# Patient Record
Sex: FEMALE | Race: BLACK OR AFRICAN AMERICAN | Employment: OTHER | URBAN - METROPOLITAN AREA
[De-identification: names, ages, dates, MRNs, and addresses within clinical notes are randomized per-mention and may not be internally consistent; named-entity substitution may affect disease eponyms.]

---

## 2017-02-28 PROBLEM — F41.9 ANXIETY: Status: ACTIVE | Noted: 2017-02-28

## 2017-02-28 PROBLEM — F32.1 MODERATE SINGLE CURRENT EPISODE OF MAJOR DEPRESSIVE DISORDER (HCC): Status: ACTIVE | Noted: 2017-02-28

## 2017-10-18 PROBLEM — Z01.419 WOMEN'S ANNUAL ROUTINE GYNECOLOGICAL EXAMINATION: Status: ACTIVE | Noted: 2017-10-18

## 2022-03-18 PROBLEM — F41.9 ANXIETY: Status: ACTIVE | Noted: 2017-02-28

## 2022-03-19 PROBLEM — Z01.419 WOMEN'S ANNUAL ROUTINE GYNECOLOGICAL EXAMINATION: Status: ACTIVE | Noted: 2017-10-18

## 2022-03-19 PROBLEM — F32.1 MODERATE SINGLE CURRENT EPISODE OF MAJOR DEPRESSIVE DISORDER (HCC): Status: ACTIVE | Noted: 2017-02-28

## 2022-04-14 ENCOUNTER — APPOINTMENT (OUTPATIENT)
Dept: GENERAL RADIOLOGY | Age: 52
End: 2022-04-14
Attending: PHYSICIAN ASSISTANT

## 2022-04-14 ENCOUNTER — HOSPITAL ENCOUNTER (EMERGENCY)
Age: 52
Discharge: HOME | End: 2022-04-14
Attending: STUDENT IN AN ORGANIZED HEALTH CARE EDUCATION/TRAINING PROGRAM

## 2022-04-14 ENCOUNTER — APPOINTMENT (OUTPATIENT)
Dept: CT IMAGING | Age: 52
End: 2022-04-14
Attending: PHYSICIAN ASSISTANT

## 2022-04-14 VITALS
RESPIRATION RATE: 16 BRPM | BODY MASS INDEX: 39.27 KG/M2 | OXYGEN SATURATION: 98 % | SYSTOLIC BLOOD PRESSURE: 148 MMHG | HEART RATE: 74 BPM | WEIGHT: 230 LBS | HEIGHT: 64 IN | DIASTOLIC BLOOD PRESSURE: 84 MMHG | TEMPERATURE: 98.2 F

## 2022-04-14 DIAGNOSIS — G44.209 TENSION HEADACHE: Primary | ICD-10-CM

## 2022-04-14 DIAGNOSIS — M62.838 CERVICAL PARASPINAL MUSCLE SPASM: ICD-10-CM

## 2022-04-14 PROCEDURE — 96372 THER/PROPH/DIAG INJ SC/IM: CPT

## 2022-04-14 PROCEDURE — 99284 EMERGENCY DEPT VISIT MOD MDM: CPT

## 2022-04-14 PROCEDURE — 70450 CT HEAD/BRAIN W/O DYE: CPT

## 2022-04-14 PROCEDURE — 72040 X-RAY EXAM NECK SPINE 2-3 VW: CPT

## 2022-04-14 PROCEDURE — 74011250636 HC RX REV CODE- 250/636: Performed by: PHYSICIAN ASSISTANT

## 2022-04-14 RX ORDER — DICLOFENAC SODIUM 75 MG/1
75 TABLET, DELAYED RELEASE ORAL 2 TIMES DAILY
Qty: 14 TABLET | Refills: 0 | Status: SHIPPED | OUTPATIENT
Start: 2022-04-14 | End: 2022-04-21

## 2022-04-14 RX ORDER — CYCLOBENZAPRINE HCL 10 MG
10 TABLET ORAL
Qty: 15 TABLET | Refills: 0 | Status: SHIPPED | OUTPATIENT
Start: 2022-04-14

## 2022-04-14 RX ORDER — KETOROLAC TROMETHAMINE 30 MG/ML
30 INJECTION, SOLUTION INTRAMUSCULAR; INTRAVENOUS ONCE
Status: COMPLETED | OUTPATIENT
Start: 2022-04-14 | End: 2022-04-14

## 2022-04-14 RX ADMIN — KETOROLAC TROMETHAMINE 30 MG: 30 INJECTION, SOLUTION INTRAMUSCULAR at 16:22

## 2022-04-14 NOTE — ED TRIAGE NOTES
Patient advises that she woke up with a \"crick\" in her neck on 4/10 and used a towel to attempt to stretch her neck muscles out. Patient advises that helped however since she has bene having pain to back posterior right side of head, no known trauma. Patient was concerned of HTN and currently is not on any medication.  Masked

## 2022-04-14 NOTE — ED PROVIDER NOTES
Patient presents to the emergency department stating that she woke up 3 days ago with some stiffness and pain on the right side of her neck like she slept awkwardly. She is not aware of any known injury. She states that over the past few days she has been having a right posterior headache and does not frequently get headaches. She denies any vertigo symptoms. No fever, URI symptoms, confusion or ataxia. No OTC pain levers have been taken today for her symptoms. She denies any pain that radiates down the right arm or any numbness or tingling.            Past Medical History:   Diagnosis Date    Moderate single current episode of major depressive disorder ([facility] [address] 2/28/2017       Past Surgical History:   Procedure Laterality Date    ABDOMEN SURGERY PROC UNLISTED      hernia    HX GYN      c-sect x 2    HX GYN      tubal ligation    HX OTHER SURGICAL      endometrial ablation     MO LAP,TUBAL CAUTERY           Family History:   Problem Relation Age of Onset    Hypertension Mother     Depression Mother     Cancer Father         pancreatic    Breast Cancer Neg Hx        Social History     Socioeconomic History    Marital status:      Spouse name: Not on file    Number of children: Not on file    Years of education: Not on file    Highest education level: Not on file   Occupational History    Not on file   Tobacco Use    Smoking status: Never Smoker    Smokeless tobacco: Never Used   Substance and Sexual Activity    Alcohol use: No    Drug use: No    Sexual activity: Yes     Partners: Male     Birth control/protection: Surgical   Other Topics Concern     Service Not Asked    Blood Transfusions Not Asked    Caffeine Concern No    Occupational Exposure Not Asked    Hobby Hazards Not Asked    Sleep Concern Not Asked    Stress Concern Not Asked    Weight Concern Not Asked    Special Diet Not Asked    Back Care Not Asked    Exercise Yes    Bike Helmet Not Asked   [address] [facility] Yes  Self-Exams Yes   Social History Narrative    Abuse: Feels safe at home, no history of physical abuse, no history of sexual abuse     Social Determinants of Health     Financial Resource Strain:     Difficulty of Paying Living Expenses: Not on file   Food Insecurity:     Worried About Running Out of Food in the Last Year: Not on file    Jaren of Food in the Last Year: Not on file   Transportation Needs:     Lack of Transportation (Medical): Not on file    Lack of Transportation (Non-Medical): Not on file   Physical Activity:     Days of Exercise per Week: Not on file    Minutes of Exercise per Session: Not on file   Stress:     Feeling of Stress : Not on file   Social Connections:     Frequency of Communication with Friends and Family: Not on file    Frequency of Social Gatherings with Friends and Family: Not on file    Attends [employer] Services: Not on file    Active Member of [address] [place] [address] [employer] or Organizations: Not on file    Attends Club or Organization Meetings: Not on file    Marital Status: Not on file   Intimate Partner Violence:     Fear of Current or Ex-Partner: Not on file    Emotionally Abused: Not on file    Physically Abused: Not on file    Sexually Abused: Not on file   Housing Stability:     Unable to Pay for Housing in the Last Year: Not on file    Number of [place] in the Last Year: Not on file    Unstable Housing in the Last Year: Not on file         ALLERGIES: Patient has no known allergies. Review of Systems   Musculoskeletal: Positive for neck pain. Neurological: Positive for headaches. Negative for dizziness. All other systems reviewed and are negative. Vitals:    04/14/22 1413 04/14/22 1416   BP: (!) 175/87 (!) 156/92   Pulse: 92    Resp: 16    Temp: 98.2 °F (36.8 °C)    SpO2: 100%    Weight: 104.3 kg (230 lb)    Height: 5' 4\" (1.626 m)             Physical Exam  Vitals and nursing note reviewed. Constitutional:       Appearance: Normal appearance.    HENT: Head: Normocephalic and atraumatic. Right Ear: Tympanic membrane normal.      Left Ear: Tympanic membrane normal.      Nose: Nose normal.      Mouth/Throat:      Mouth: Mucous membranes are moist.   Eyes:      Extraocular Movements: Extraocular movements intact. Conjunctiva/sclera: Conjunctivae normal.      Pupils: Pupils are equal, round, and reactive to light. Cardiovascular:      Rate and Rhythm: Normal rate and regular rhythm. Pulses: Normal pulses. Heart sounds: Normal heart sounds. Pulmonary:      Effort: Pulmonary effort is normal.      Breath sounds: Normal breath sounds. Abdominal:      General: Abdomen is flat. There is no distension. Palpations: Abdomen is soft. Tenderness: There is no abdominal tenderness. There is no guarding. Musculoskeletal:      Cervical back: Normal range of motion and neck supple. Comments: Has mild midline cervical spinous process tenderness to palpation and tenderness of the right trapezius muscle. Patient is able to flex her chin down to her chest wall. Skin:     General: Skin is warm and dry. Neurological:      General: No focal deficit present. Mental Status: She is alert. Cranial Nerves: No cranial nerve deficit. Motor: No weakness. Gait: Gait normal.   Psychiatric:         Mood and Affect: Mood normal.         Behavior: Behavior normal.         Thought Content: Thought content normal.          MDM  Number of Diagnoses or Management Options  Cervical paraspinal muscle spasm  Tension headache  Diagnosis management comments: Differential diagnoses: Torticollis, cervical muscle spasm, tension headache, intracranial hemorrhage    Patient's head CT is unremarkable and cervical spine shows a loss of normal lordosis. She was given IM Toradol for pain which has helped. I will give her prescription for a muscle relaxant and anti-inflammatory.        Amount and/or Complexity of Data Reviewed  Tests in the radiology section of CPT®: reviewed    Risk of Complications, Morbidity, and/or Mortality  Presenting problems: moderate  Diagnostic procedures: moderate  Management options: low    Patient Progress  Patient progress: improved         Procedures

## 2022-04-14 NOTE — ED NOTES
I have reviewed discharge instructions with the patient. The patient verbalized understanding. Patient left ED via Discharge Method: ambulatory to Home with self . Opportunity for questions and clarification provided. Patient given 2 scripts. To continue your aftercare when you leave the hospital, you may receive an automated call from our care team to check in on how you are doing. This is a free service and part of our promise to provide the best care and service to meet your aftercare needs.  If you have questions, or wish to unsubscribe from this service please call [phone]. Thank you for Choosing our [facility] Emergency Department.

## 2022-04-14 NOTE — Clinical Note
[address] [address] EMERGENCY DEPT  [address] [address] [phone]    Work/School Note    Date: 4/14/2022    To Whom It May concern:    Ney Dumont was seen and treated today in the emergency room by the following provider(s):  Attending Provider: Kathy Prakash DO  Physician Assistant: Yanira Amaya, [address]. Ney Dumont is excused from work/school on 4/14/2022 through 4/16/2022. She is medically clear to return to work/school on 4/17/2022.          Sincerely,          LUCILA Ansari

## 2022-04-14 NOTE — DISCHARGE INSTRUCTIONS
Follow-up with a family physician next week if pain is not improving. If you need 1 you can contact the [address] clinic or [facility].

## 2024-03-12 ENCOUNTER — TRANSCRIBE ORDERS (OUTPATIENT)
Dept: SCHEDULING | Age: 54
End: 2024-03-12

## 2024-03-12 DIAGNOSIS — Z12.31 ENCOUNTER FOR SCREENING MAMMOGRAM FOR MALIGNANT NEOPLASM OF BREAST: Primary | ICD-10-CM

## 2024-03-16 ENCOUNTER — HOSPITAL ENCOUNTER (OUTPATIENT)
Dept: MAMMOGRAPHY | Age: 54
End: 2024-03-16
Payer: COMMERCIAL

## 2024-03-16 VITALS — BODY MASS INDEX: 37.56 KG/M2 | HEIGHT: 64 IN | WEIGHT: 220 LBS

## 2024-03-16 DIAGNOSIS — Z12.31 ENCOUNTER FOR SCREENING MAMMOGRAM FOR MALIGNANT NEOPLASM OF BREAST: ICD-10-CM

## 2024-03-16 PROCEDURE — 77063 BREAST TOMOSYNTHESIS BI: CPT

## 2024-06-03 VITALS
WEIGHT: 230 LBS | DIASTOLIC BLOOD PRESSURE: 89 MMHG | TEMPERATURE: 98.5 F | RESPIRATION RATE: 16 BRPM | SYSTOLIC BLOOD PRESSURE: 145 MMHG | OXYGEN SATURATION: 98 % | HEART RATE: 91 BPM | BODY MASS INDEX: 39.27 KG/M2 | HEIGHT: 64 IN

## 2024-06-03 PROCEDURE — 99283 EMERGENCY DEPT VISIT LOW MDM: CPT

## 2024-06-03 ASSESSMENT — LIFESTYLE VARIABLES
HOW MANY STANDARD DRINKS CONTAINING ALCOHOL DO YOU HAVE ON A TYPICAL DAY: PATIENT DOES NOT DRINK
HOW OFTEN DO YOU HAVE A DRINK CONTAINING ALCOHOL: NEVER

## 2024-06-03 ASSESSMENT — PAIN DESCRIPTION - PAIN TYPE: TYPE: ACUTE PAIN

## 2024-06-03 ASSESSMENT — PAIN - FUNCTIONAL ASSESSMENT: PAIN_FUNCTIONAL_ASSESSMENT: 0-10

## 2024-06-03 ASSESSMENT — PAIN SCALES - GENERAL: PAINLEVEL_OUTOF10: 5

## 2024-06-03 ASSESSMENT — PAIN DESCRIPTION - FREQUENCY: FREQUENCY: INTERMITTENT

## 2024-06-03 ASSESSMENT — PAIN DESCRIPTION - ONSET: ONSET: SUDDEN

## 2024-06-03 ASSESSMENT — PAIN DESCRIPTION - LOCATION: LOCATION: KNEE

## 2024-06-03 ASSESSMENT — PAIN DESCRIPTION - ORIENTATION: ORIENTATION: LEFT

## 2024-06-04 ENCOUNTER — HOSPITAL ENCOUNTER (EMERGENCY)
Age: 54
Discharge: HOME | End: 2024-06-04
Payer: COMMERCIAL

## 2024-06-04 ENCOUNTER — APPOINTMENT (OUTPATIENT)
Dept: GENERAL RADIOLOGY | Age: 54
End: 2024-06-04
Payer: COMMERCIAL

## 2024-06-04 DIAGNOSIS — M25.462 KNEE EFFUSION, LEFT: Primary | ICD-10-CM

## 2024-06-04 PROCEDURE — 6370000000 HC RX 637 (ALT 250 FOR IP): Performed by: NURSE PRACTITIONER

## 2024-06-04 PROCEDURE — 73562 X-RAY EXAM OF KNEE 3: CPT

## 2024-06-04 RX ORDER — IBUPROFEN 600 MG/1
600 TABLET ORAL EVERY 8 HOURS PRN
Qty: 12 TABLET | Refills: 0 | Status: SHIPPED | OUTPATIENT
Start: 2024-06-04 | End: 2024-06-08

## 2024-06-04 RX ORDER — IBUPROFEN 600 MG/1
600 TABLET ORAL
Status: COMPLETED | OUTPATIENT
Start: 2024-06-04 | End: 2024-06-04

## 2024-06-04 RX ADMIN — IBUPROFEN 600 MG: 600 TABLET, FILM COATED ORAL at 00:42

## 2024-06-04 NOTE — ED PROVIDER NOTES
Emergency Department Provider Note       PCP: No, Pcp   Age: 54 y.o.   Sex: female     DISPOSITION Discharge - Pending Orders Complete 06/04/2024 01:17:28 AM       ICD-10-CM    1. Knee effusion, left  M25.462 [facility]          Medical Decision Making     As in HPI.  Left knee pain with no other complaint.  Is neurovascular intact.  No fall or injury.  No pain distal to or proximal to the left knee.  Made worse with range of motion.  Has history of OA.  Has regular recurring left knee pain.  No recent illness.  I have low clinical suspicion at this time for septic joint, dislocation, arterial injury, DVT, or other acute emergent process.  Radiographs obtained and notable for small suprapatellar effusion.  Will place in Ace wrap, discussed therapeutic options, will give NSAIDs and refer to orthopedics for close follow-up.  She declined crutches.  Patient is well-hydrated appearing, no distress.  Nontoxic-appearing, tolerating oral intake, hemodynamically stable. All findings and plan were discussed with the patient. All questions answered. Discussed with the patient that an unremarkable evaluation in the ED does not preclude the development or presence of a serious or life threatening condition. Patient was instructed to return immediately for any worsening or change in current symptoms, or if symptoms do not continue to improve. I instructed them to follow up with their primary care provider, own specialist, or medical provider that I am recommending for him within the next 2-3 days  The patient acknowledged understanding plan of care and affirmed approval.     Signed by: MAEVE Lyon     This note created using Dragon voice recognition software.  Please excuse any accidental errors associated with its use, as note has not been fully proofread and edited.         1 or more acute illnesses that pose a threat to life or bodily function.   1 or more chronic illnesses with a

## 2024-06-04 NOTE — ED TRIAGE NOTES
Pt arrives A&Ox4 ambulatory. Pt reports at work tonight started having pain then subsequent swelling in L knee. Denies redness/warmth. Pt reports hx arthritis.

## 2024-06-04 NOTE — DISCHARGE INSTRUCTIONS
Follow-up with recommended provider in the next 1-2 days.  Return to the ED immediately for any new, worsening, concerning symptoms; or for danger signs as discussed.

## 2024-07-25 ENCOUNTER — OFFICE VISIT (OUTPATIENT)
Dept: ORTHOPEDIC SURGERY | Age: 54
End: 2024-07-25
Payer: COMMERCIAL

## 2024-07-25 VITALS — HEIGHT: 64 IN | WEIGHT: 230 LBS | BODY MASS INDEX: 39.27 KG/M2

## 2024-07-25 DIAGNOSIS — M25.561 PAIN IN BOTH KNEES, UNSPECIFIED CHRONICITY: Primary | ICD-10-CM

## 2024-07-25 DIAGNOSIS — M76.899 HAMSTRING TENDONITIS: ICD-10-CM

## 2024-07-25 DIAGNOSIS — M17.11 PRIMARY OSTEOARTHRITIS OF RIGHT KNEE: ICD-10-CM

## 2024-07-25 DIAGNOSIS — M25.562 PAIN IN BOTH KNEES, UNSPECIFIED CHRONICITY: Primary | ICD-10-CM

## 2024-07-25 DIAGNOSIS — M17.12 PRIMARY OSTEOARTHRITIS OF LEFT KNEE: ICD-10-CM

## 2024-07-25 PROCEDURE — 99204 OFFICE O/P NEW MOD 45 MIN: CPT | Performed by: PHYSICIAN ASSISTANT

## 2024-07-25 RX ORDER — DICLOFENAC SODIUM 75 MG/1
75 TABLET, DELAYED RELEASE ORAL 2 TIMES DAILY
Qty: 60 TABLET | Refills: 1 | Status: SHIPPED | OUTPATIENT
Start: 2024-07-25

## 2024-07-25 NOTE — PROGRESS NOTES
Name: Janay Oreilly  YOB: 1970  Gender: female  MRN: [id number]    CC: Knee Pain (L)  And right knee pain    HPI: Janay Oreilly is a 54 y.o. female who presents with left and right knee pain    She is here today for bilateral knee pain.  She was seen in the ER in early June for the left knee because of increased pain after work.  She has had knee pain for a while now. She received cortisone injections from a doctor in [place] but she did not have any relief from the shots and states she had a bad reaction to the shots.  She is not interested in having those again. She went to the ER on 6/4/24 and they told her she had some fluid on her knee and they told her to rest and take ibuprofen as needed.  She states she does have pain going up and down stairs.  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  She states all of her pain is along the medial aspect of this knee.  She is here today for further evaluation of bilateral knee pain.    ROS/Meds/PSH/PMH/FH/SH: I personally reviewed the patients standard intake form.  Below are the pertinents    Tobacco:  reports that she has never smoked. She has never used smokeless tobacco.  Diabetes: none  Other: None    Physical Examination:  General: no acute distress  Lungs: breathing easily  CV: regular rhythm by pulse  Right Knee: scar present on anterior knee. No joint effusion present. Patella tracks centrally with 2+ patellofemoral grind. Neutral mechanical alignment present. Passive ROM: 0 degrees of hyperextension with 110 degrees of flexion. Stable to varus and valgus stress at full extension and 30 degrees of flexion.  Ligamentously stable.  No pain with McMurrays over medial or lateral joint line. Tender to palpate over Medial joint line. negative Apprehension test.  Tender palpate along hamstring tendons.  She does have tenderness on the pes bursa.  Calf is soft and nontender to palpation. Motor and sensory intact distally.

## 2024-09-05 ENCOUNTER — OFFICE VISIT (OUTPATIENT)
Dept: ORTHOPEDIC SURGERY | Age: 54
End: 2024-09-05
Payer: COMMERCIAL

## 2024-09-05 DIAGNOSIS — M17.11 PRIMARY OSTEOARTHRITIS OF RIGHT KNEE: ICD-10-CM

## 2024-09-05 DIAGNOSIS — M25.562 PAIN IN BOTH KNEES, UNSPECIFIED CHRONICITY: Primary | ICD-10-CM

## 2024-09-05 DIAGNOSIS — M17.12 PRIMARY OSTEOARTHRITIS OF LEFT KNEE: ICD-10-CM

## 2024-09-05 DIAGNOSIS — M25.561 PAIN IN BOTH KNEES, UNSPECIFIED CHRONICITY: Primary | ICD-10-CM

## 2024-09-05 PROCEDURE — 20610 DRAIN/INJ JOINT/BURSA W/O US: CPT | Performed by: PHYSICIAN ASSISTANT

## 2024-09-05 NOTE — PROGRESS NOTES
Name: Janay Oreilly  YOB: 1970  Gender: female  MRN: [id number]    CC: Knee Pain (B)     HPI: Janay Oreilly is a 54 y.o. female who presents first for injection of Synvisc.     Physical Examination:  General: no acute distress  bilateral Knee: Exam is unchanged, the knee continues to be painful with palpation and range of motion. There is no effusion or concern for infection.    Assessment:   1. Pain in both knees, unspecified chronicity    2. Primary osteoarthritis of right knee    3. Primary osteoarthritis of left knee         Plan:     Bilateral knee injected from a anterior lateral approach with 2 mL Synvisc viscosupplementation after sterile prep.  Patient tolerated the procedure well was given postinjection flare precautions.      Follow up for second injection next week.       Fatou Arshad PA-C  Orthopaedics and Sports Medicine

## 2024-09-12 ENCOUNTER — OFFICE VISIT (OUTPATIENT)
Dept: ORTHOPEDIC SURGERY | Age: 54
End: 2024-09-12
Payer: COMMERCIAL

## 2024-09-12 DIAGNOSIS — M17.11 PRIMARY OSTEOARTHRITIS OF RIGHT KNEE: ICD-10-CM

## 2024-09-12 DIAGNOSIS — M25.561 PAIN IN BOTH KNEES, UNSPECIFIED CHRONICITY: Primary | ICD-10-CM

## 2024-09-12 DIAGNOSIS — M17.12 PRIMARY OSTEOARTHRITIS OF LEFT KNEE: ICD-10-CM

## 2024-09-12 DIAGNOSIS — M25.562 PAIN IN BOTH KNEES, UNSPECIFIED CHRONICITY: Primary | ICD-10-CM

## 2024-09-12 PROCEDURE — 20610 DRAIN/INJ JOINT/BURSA W/O US: CPT | Performed by: PHYSICIAN ASSISTANT

## 2024-09-19 ENCOUNTER — OFFICE VISIT (OUTPATIENT)
Dept: ORTHOPEDIC SURGERY | Age: 54
End: 2024-09-19
Payer: COMMERCIAL

## 2024-09-19 ENCOUNTER — TELEPHONE (OUTPATIENT)
Dept: ORTHOPEDIC SURGERY | Age: 54
End: 2024-09-19

## 2024-09-19 DIAGNOSIS — M17.12 PRIMARY OSTEOARTHRITIS OF LEFT KNEE: ICD-10-CM

## 2024-09-19 DIAGNOSIS — M17.11 PRIMARY OSTEOARTHRITIS OF RIGHT KNEE: Primary | ICD-10-CM

## 2024-09-19 PROCEDURE — 20610 DRAIN/INJ JOINT/BURSA W/O US: CPT | Performed by: PHYSICIAN ASSISTANT

## 2024-09-20 ENCOUNTER — TELEPHONE (OUTPATIENT)
Age: 54
End: 2024-09-20

## 2024-12-16 ENCOUNTER — TELEPHONE (OUTPATIENT)
Dept: ORTHOPEDIC SURGERY | Age: 54
End: 2024-12-16

## 2024-12-18 NOTE — PROGRESS NOTES
Name: Janay Oreilly  YOB: 1970  Gender: female  MRN: [id number]    CC: Knee Pain (L)     HPI: Janay Oreilly is a 54 y.o. female who presents with left knee pain. She is an existing patient of mine coming in today for a new patient. She just completed a Synvisc series on 9/19/24. She fell at work on 12/16/24.  She states she was doing well after the Synvisc injections and was happy with her progress.  She states she is seeing some improvement after this fall at work.  She describes a fall onto a flexed knee.  She notes no instability but had some soreness on the anterior part of her knee.  She got a knee brace from [employer] and was using heat and ice and notes significant amount improvement.  She states she went to come get this checked out before she went back to work.  She is here today for further evaluation of left knee pain after fall.  She notes no significant pain today.  She notes no mechanical symptoms.  She has been taking diclofenac which has helped her pain.    ROS/Meds/PSH/PMH/FH/SH: I personally reviewed the patients standard intake form.  Below are the pertinents    Tobacco:  reports that she has never smoked. She has never used smokeless tobacco.  Diabetes: none  Other: none    Physical Examination:  General: no acute distress  Lungs: breathing easily  CV: regular rhythm by pulse  Left Knee: No previous surgical scars present. No joint effusion present. Patella tracks centrally with no patellofemoral grind. Neutral mechanical alignment present. Passive ROM: 0 degrees of hyperextension with 120 degrees of flexion.  Ligamentously stable x 4. no pain with forced flexion. No pain with McMurrays over medial or lateral joint line.  No tenderness on the medial lateral joint line.. Calf is soft and nontender to palpation. Motor and sensory intact distally. Dorsalis pedis pulse 2+.      Imaging:   Knee XR: 4 views     Clinical Indication  1. Primary osteoarthritis of right knee    2.

## 2024-12-19 ENCOUNTER — OFFICE VISIT (OUTPATIENT)
Dept: ORTHOPEDIC SURGERY | Age: 54
End: 2024-12-19
Payer: COMMERCIAL

## 2024-12-19 DIAGNOSIS — M17.12 PRIMARY OSTEOARTHRITIS OF LEFT KNEE: Primary | ICD-10-CM

## 2024-12-19 DIAGNOSIS — S80.02XA CONTUSION OF LEFT KNEE, INITIAL ENCOUNTER: Primary | ICD-10-CM

## 2024-12-19 DIAGNOSIS — M17.11 PRIMARY OSTEOARTHRITIS OF RIGHT KNEE: ICD-10-CM

## 2024-12-19 DIAGNOSIS — M17.12 PRIMARY OSTEOARTHRITIS OF LEFT KNEE: ICD-10-CM

## 2024-12-19 PROCEDURE — 99213 OFFICE O/P EST LOW 20 MIN: CPT | Performed by: PHYSICIAN ASSISTANT

## 2024-12-19 RX ORDER — DICLOFENAC SODIUM 75 MG/1
75 TABLET, DELAYED RELEASE ORAL 2 TIMES DAILY
Qty: 60 TABLET | Refills: 1 | Status: SHIPPED | OUTPATIENT
Start: 2024-12-19

## 2024-12-19 RX ORDER — DICLOFENAC SODIUM 75 MG/1
75 TABLET, DELAYED RELEASE ORAL 2 TIMES DAILY
Qty: 30 TABLET | Refills: 1 | OUTPATIENT
Start: 2024-12-19